# Patient Record
Sex: MALE | Race: WHITE | URBAN - METROPOLITAN AREA
[De-identification: names, ages, dates, MRNs, and addresses within clinical notes are randomized per-mention and may not be internally consistent; named-entity substitution may affect disease eponyms.]

---

## 2020-11-12 ENCOUNTER — OFFICE VISIT (OUTPATIENT)
Dept: SURGERY | Facility: CLINIC | Age: 65
End: 2020-11-12
Payer: COMMERCIAL

## 2020-11-12 VITALS
SYSTOLIC BLOOD PRESSURE: 132 MMHG | WEIGHT: 180 LBS | TEMPERATURE: 97 F | HEART RATE: 84 BPM | BODY MASS INDEX: 25.2 KG/M2 | HEIGHT: 71 IN | DIASTOLIC BLOOD PRESSURE: 87 MMHG

## 2020-11-12 DIAGNOSIS — K59.4 PROCTALGIA FUGAX: ICD-10-CM

## 2020-11-12 DIAGNOSIS — K59.1 FUNCTIONAL DIARRHEA: ICD-10-CM

## 2020-11-12 DIAGNOSIS — G24.9 DYSTONIA: ICD-10-CM

## 2020-11-12 DIAGNOSIS — R15.1 FECAL SMEARING: Primary | ICD-10-CM

## 2020-11-12 DIAGNOSIS — K64.2 PROLAPSED INTERNAL HEMORRHOIDS, GRADE 3: ICD-10-CM

## 2020-11-12 DIAGNOSIS — Z98.890 HISTORY OF LAPAROTOMY: ICD-10-CM

## 2020-11-12 PROCEDURE — 3075F SYST BP GE 130 - 139MM HG: CPT | Performed by: COLON & RECTAL SURGERY

## 2020-11-12 PROCEDURE — 3008F BODY MASS INDEX DOCD: CPT | Performed by: COLON & RECTAL SURGERY

## 2020-11-12 PROCEDURE — 3079F DIAST BP 80-89 MM HG: CPT | Performed by: COLON & RECTAL SURGERY

## 2020-11-12 PROCEDURE — 99072 ADDL SUPL MATRL&STAF TM PHE: CPT | Performed by: COLON & RECTAL SURGERY

## 2020-11-12 PROCEDURE — 99245 OFF/OP CONSLTJ NEW/EST HI 55: CPT | Performed by: COLON & RECTAL SURGERY

## 2020-11-12 PROCEDURE — 46600 DIAGNOSTIC ANOSCOPY SPX: CPT | Performed by: COLON & RECTAL SURGERY

## 2020-11-12 RX ORDER — ZOLPIDEM TARTRATE 5 MG/1
5 TABLET ORAL NIGHTLY PRN
COMMUNITY
Start: 2020-09-25

## 2020-11-12 RX ORDER — CARBIDOPA AND LEVODOPA 50; 200 MG/1; MG/1
TABLET, EXTENDED RELEASE ORAL
COMMUNITY
Start: 2020-08-18

## 2020-11-12 NOTE — PATIENT INSTRUCTIONS
The patient presents today in consultation of Dr. Elana Narayanan for leakage and rectal pain. The patient states that for about the last 7-8 months now he has been having issues with rectal pressure and leakage.   He states that in the mornings when he wakes up pain goes away on its own. The patient does have a significant past medical history of dystonia. He also takes over-the-counter supplements for difficulty sleeping. The supplements include tart cherry, valerian root, and magnesium.     The patient also gastrocolic reflex is stimulated anytime that there is an oral intake with any type of flavoring into the stomach, this send the signal to the colon that it is time to empty.   It usually empties around 45 minutes after a meal.  I discussed with the patient

## 2020-11-12 NOTE — H&P
New Patient Visit Note       Active Problems      1. Fecal smearing    2. Proctalgia fugax    3. Prolapsed internal hemorrhoids, grade 3    4. Functional diarrhea    5. Dystonia    6.  History of laparotomy        Chief Complaint   Patient presents with:  A nausea, vomiting, fevers, chills, or unintentional weight loss. The patient's most recent colonoscopy was performed by Dr. Mychal Rodríguez earlier this year and he did not have any acute findings on his colonoscopy.     The patient also complains secondarily of a history have been reviewed by me today. History reviewed. No pertinent family history.   Social History    Socioeconomic History      Marital status: Single      Spouse name: Not on file      Number of children: Not on file      Years of education: Not o regular rhythm, S1 normal, S2 normal and normal heart sounds. No murmur heard. Pulmonary/Chest: No accessory muscle usage. No tachypnea. No respiratory distress. He has no decreased breath sounds. He has no wheezes. He has no rhonchi. He has no rales. present. Left: No inguinal and no supraclavicular adenopathy present. Nursing note and vitals reviewed.           Assessment   Fecal smearing  (primary encounter diagnosis)  Proctalgia fugax  Prolapsed internal hemorrhoids, grade 3  Functional diar complains secondarily of an inconsistent rectal pain that will occur approximately 8 times per year. He states when this happens he will get a sharp pain near his anus and rectum that will last for 30 minutes to an hour.   He states this pain is intolerabl of the supplements cold turkey if he is able to for his difficulty sleeping. If he was to stay on 1, I told the patient to stay on the tart cherry. I also counseled the patient on the gastrocolic reflex.   I discussed with the patient that he is eating

## 2020-11-13 NOTE — PROCEDURES
Procedure:  Anoscopy    Surgeon: Haris Darby    Anesthesia: None    Findings: See the progress note attached for all findings    Operative Summary: The patient was placed in a prone position on the proctoscopy table, the hips were flexed in the jackknife p

## (undated) NOTE — LETTER
20     Patient: Gael Green  : 1955 Visit date: 2020    Dear  Israel Marie    Thank you for referring Gael Green to my practice. Please find my assessment and plan below.        Assessment   Fecal smearing  (primary encounter The patient's most recent colonoscopy was performed by Dr. Beatrice Rankin earlier this year and he did not have any acute findings on his colonoscopy.     The patient also complains secondarily of an inconsistent rectal pain that will occur approximately 8 times pe However, I do think that the patient supplements that he is taking are a contributing factor to his leakage.   I highly recommended the patient stop taking his magnesium, as discussed that magnesium is a cathartic and irritates the bowel causing him to have I will see the patient back again on December 3, 2020 for continued care and evaluation of his rectal leakage and his proctalgia fugax to discuss the results and how his treatment is working for his leakage.         Sincerely,       Mary Singleton MD   CC: [facility]